# Patient Record
Sex: MALE | URBAN - METROPOLITAN AREA
[De-identification: names, ages, dates, MRNs, and addresses within clinical notes are randomized per-mention and may not be internally consistent; named-entity substitution may affect disease eponyms.]

---

## 2017-05-03 ENCOUNTER — IMPORTED ENCOUNTER (OUTPATIENT)
Dept: URBAN - METROPOLITAN AREA CLINIC 1 | Facility: CLINIC | Age: 32
End: 2017-05-03

## 2017-05-03 PROBLEM — H10.022: Noted: 2017-05-03

## 2017-05-03 PROCEDURE — 92002 INTRM OPH EXAM NEW PATIENT: CPT

## 2017-05-03 NOTE — PATIENT DISCUSSION
1.  Bacterial Conjunctivitis OS -- Being Tobradex QID OS (erx). 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 The condition was discussed with the patient. Topical antibiotic drops were prescribed. The mechanism of transmission was explained. The patient was advised to wash his hands and use separate towels and bedding. Return for an appointment in 1 week 10 with Dr. Kathia Sanders.

## 2018-10-30 NOTE — PATIENT DISCUSSION
HAS DIF WITH MIDDLE OF VISION, FLUCTUATES, IF IT CONTINUES AFTER CAT SURGERY PT TO RETURN TO SEE DR LOZADA ON THURSDAY MORNING NPO FOR POSSIBLE SURGERY THAT PM.

## 2022-04-02 ASSESSMENT — VISUAL ACUITY
OS_SC: 20/70
OS_PH: SC 20/20
OD_SC: 20/25

## 2022-04-02 ASSESSMENT — TONOMETRY
OD_IOP_MMHG: 17
OS_IOP_MMHG: 17